# Patient Record
Sex: FEMALE | Race: WHITE | NOT HISPANIC OR LATINO | URBAN - METROPOLITAN AREA
[De-identification: names, ages, dates, MRNs, and addresses within clinical notes are randomized per-mention and may not be internally consistent; named-entity substitution may affect disease eponyms.]

---
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
## 2019-01-08 ENCOUNTER — AMBULATORY - RIVER FALLS (OUTPATIENT)
Dept: FAMILY MEDICINE | Facility: CLINIC | Age: 19
End: 2019-01-08

## 2019-01-08 ENCOUNTER — OFFICE VISIT - RIVER FALLS (OUTPATIENT)
Dept: FAMILY MEDICINE | Facility: CLINIC | Age: 19
End: 2019-01-08

## 2019-01-08 ASSESSMENT — MIFFLIN-ST. JEOR: SCORE: 1505.67

## 2020-08-29 ENCOUNTER — LAB REQUISITION (OUTPATIENT)
Age: 20
End: 2020-08-29
Payer: COMMERCIAL

## 2020-08-29 DIAGNOSIS — Z20.822 ENCOUNTER FOR SCREENING LABORATORY TESTING FOR COVID-19 VIRUS: ICD-10-CM

## 2020-08-31 LAB — SARS-COV-2 BY PCR: NOT DETECTED

## 2020-10-03 ENCOUNTER — LAB REQUISITION (OUTPATIENT)
Age: 20
End: 2020-10-03
Payer: COMMERCIAL

## 2020-10-03 DIAGNOSIS — Z20.828 CONTACT WITH OR EXPOSURE TO VIRAL DISEASE: ICD-10-CM

## 2020-10-24 ENCOUNTER — LAB REQUISITION (OUTPATIENT)
Age: 20
End: 2020-10-24
Payer: COMMERCIAL

## 2020-10-24 DIAGNOSIS — Z20.828 CONTACT WITH OR EXPOSURE TO VIRAL DISEASE: ICD-10-CM

## 2020-11-07 ENCOUNTER — LAB REQUISITION (OUTPATIENT)
Age: 20
End: 2020-11-07
Payer: COMMERCIAL

## 2020-11-07 DIAGNOSIS — Z20.828 CONTACT WITH OR EXPOSURE TO VIRAL DISEASE: ICD-10-CM

## 2021-08-24 ENCOUNTER — HOSPITAL ENCOUNTER (EMERGENCY)
Facility: HOSPITAL | Age: 21
Discharge: LEFT BEFORE DISCHARGE | End: 2021-08-24
Payer: COMMERCIAL

## 2021-08-25 ENCOUNTER — APPOINTMENT (OUTPATIENT)
Dept: CT IMAGING | Facility: HOSPITAL | Age: 21
DRG: 392 | End: 2021-08-25
Attending: STUDENT IN AN ORGANIZED HEALTH CARE EDUCATION/TRAINING PROGRAM
Payer: COMMERCIAL

## 2021-08-25 ENCOUNTER — HOSPITAL ENCOUNTER (INPATIENT)
Facility: HOSPITAL | Age: 21
LOS: 1 days | Discharge: HOME | DRG: 392 | End: 2021-08-26
Attending: STUDENT IN AN ORGANIZED HEALTH CARE EDUCATION/TRAINING PROGRAM | Admitting: HOSPITALIST
Payer: COMMERCIAL

## 2021-08-25 DIAGNOSIS — R11.0 NAUSEA: ICD-10-CM

## 2021-08-25 DIAGNOSIS — R10.9 ABDOMINAL PAIN, ACUTE: Primary | ICD-10-CM

## 2021-08-25 DIAGNOSIS — K52.9 COLITIS: ICD-10-CM

## 2021-08-25 DIAGNOSIS — R19.7 DIARRHEA, UNSPECIFIED TYPE: ICD-10-CM

## 2021-08-25 DIAGNOSIS — N39.0 URINARY TRACT INFECTION WITHOUT HEMATURIA, SITE UNSPECIFIED: ICD-10-CM

## 2021-08-25 LAB
ALBUMIN SERPL-MCNC: 3.5 G/DL (ref 3.4–5)
ALBUMIN/GLOB SERPL: 0.8 {RATIO} (ref 1–2)
ALP LIVER SERPL-CCNC: 57 U/L
ALT SERPL-CCNC: 18 U/L
ANION GAP SERPL CALC-SCNC: 4 MMOL/L (ref 0–18)
AST SERPL-CCNC: 16 U/L (ref 15–37)
B-HCG UR QL: NEGATIVE
BASOPHILS # BLD: 0 X10(3) UL (ref 0–0.2)
BASOPHILS NFR BLD: 0 %
BILIRUB SERPL-MCNC: 0.4 MG/DL (ref 0.1–2)
BILIRUB UR QL STRIP.AUTO: NEGATIVE
BUN BLD-MCNC: 8 MG/DL (ref 7–18)
CALCIUM BLD-MCNC: 8.7 MG/DL (ref 8.5–10.1)
CHLORIDE SERPL-SCNC: 106 MMOL/L (ref 98–112)
CO2 SERPL-SCNC: 25 MMOL/L (ref 21–32)
CREAT BLD-MCNC: 0.9 MG/DL
EOSINOPHIL # BLD: 0 X10(3) UL (ref 0–0.7)
EOSINOPHIL NFR BLD: 0 %
ERYTHROCYTE [DISTWIDTH] IN BLOOD BY AUTOMATED COUNT: 11.9 %
GLOBULIN PLAS-MCNC: 4.2 G/DL (ref 2.8–4.4)
GLUCOSE BLD-MCNC: 96 MG/DL (ref 70–99)
GLUCOSE UR STRIP.AUTO-MCNC: NEGATIVE MG/DL
HCT VFR BLD AUTO: 42.9 %
HGB BLD-MCNC: 14.2 G/DL
LIPASE SERPL-CCNC: 39 U/L (ref 73–393)
LYMPHOCYTES NFR BLD: 1.62 X10(3) UL (ref 1–4)
LYMPHOCYTES NFR BLD: 20 %
M PROTEIN MFR SERPL ELPH: 7.7 G/DL (ref 6.4–8.2)
MCH RBC QN AUTO: 30.5 PG (ref 26–34)
MCHC RBC AUTO-ENTMCNC: 33.1 G/DL (ref 31–37)
MCV RBC AUTO: 92.3 FL
MONOCYTES # BLD: 1.05 X10(3) UL (ref 0.1–1)
MONOCYTES NFR BLD: 13 %
MORPHOLOGY: NORMAL
NEUTROPHILS # BLD AUTO: 5.47 X10 (3) UL (ref 1.5–7.7)
NEUTROPHILS NFR BLD: 42 %
NEUTS BAND NFR BLD: 25 %
NEUTS HYPERSEG # BLD: 5.43 X10(3) UL (ref 1.5–7.7)
NITRITE UR QL STRIP.AUTO: NEGATIVE
OSMOLALITY SERPL CALC.SUM OF ELEC: 278 MOSM/KG (ref 275–295)
PH UR STRIP.AUTO: 5 [PH] (ref 5–8)
PLATELET # BLD AUTO: 207 10(3)UL (ref 150–450)
PLATELET MORPHOLOGY: NORMAL
POTASSIUM SERPL-SCNC: 3.4 MMOL/L (ref 3.5–5.1)
PROT UR STRIP.AUTO-MCNC: 30 MG/DL
RBC # BLD AUTO: 4.65 X10(6)UL
RBC UR QL AUTO: NEGATIVE
SARS-COV-2 RNA RESP QL NAA+PROBE: NOT DETECTED
SODIUM SERPL-SCNC: 135 MMOL/L (ref 136–145)
SP GR UR STRIP.AUTO: 1.02 (ref 1–1.03)
TOTAL CELLS COUNTED: 100
UROBILINOGEN UR STRIP.AUTO-MCNC: <2 MG/DL
WBC # BLD AUTO: 8.1 X10(3) UL (ref 4–11)
WBC #/AREA URNS AUTO: >50 /HPF

## 2021-08-25 PROCEDURE — 74177 CT ABD & PELVIS W/CONTRAST: CPT | Performed by: STUDENT IN AN ORGANIZED HEALTH CARE EDUCATION/TRAINING PROGRAM

## 2021-08-25 PROCEDURE — 99222 1ST HOSP IP/OBS MODERATE 55: CPT | Performed by: HOSPITALIST

## 2021-08-25 RX ORDER — ONDANSETRON 2 MG/ML
4 INJECTION INTRAMUSCULAR; INTRAVENOUS EVERY 6 HOURS PRN
Status: DISCONTINUED | OUTPATIENT
Start: 2021-08-25 | End: 2021-08-26

## 2021-08-25 RX ORDER — CIPROFLOXACIN 2 MG/ML
400 INJECTION, SOLUTION INTRAVENOUS EVERY 12 HOURS
Status: DISCONTINUED | OUTPATIENT
Start: 2021-08-25 | End: 2021-08-26

## 2021-08-25 RX ORDER — METRONIDAZOLE 500 MG/100ML
500 INJECTION, SOLUTION INTRAVENOUS ONCE
Status: COMPLETED | OUTPATIENT
Start: 2021-08-25 | End: 2021-08-25

## 2021-08-25 RX ORDER — KETOROLAC TROMETHAMINE 30 MG/ML
15 INJECTION, SOLUTION INTRAMUSCULAR; INTRAVENOUS ONCE
Status: COMPLETED | OUTPATIENT
Start: 2021-08-25 | End: 2021-08-25

## 2021-08-25 RX ORDER — METRONIDAZOLE 500 MG/100ML
500 INJECTION, SOLUTION INTRAVENOUS EVERY 8 HOURS
Status: DISCONTINUED | OUTPATIENT
Start: 2021-08-25 | End: 2021-08-26

## 2021-08-25 RX ORDER — ONDANSETRON 2 MG/ML
4 INJECTION INTRAMUSCULAR; INTRAVENOUS EVERY 4 HOURS PRN
Status: DISCONTINUED | OUTPATIENT
Start: 2021-08-25 | End: 2021-08-25 | Stop reason: ALTCHOICE

## 2021-08-25 RX ORDER — SODIUM CHLORIDE 9 MG/ML
INJECTION, SOLUTION INTRAVENOUS CONTINUOUS
Status: DISCONTINUED | OUTPATIENT
Start: 2021-08-25 | End: 2021-08-26

## 2021-08-25 RX ORDER — CIPROFLOXACIN 2 MG/ML
400 INJECTION, SOLUTION INTRAVENOUS ONCE
Status: COMPLETED | OUTPATIENT
Start: 2021-08-25 | End: 2021-08-25

## 2021-08-25 RX ORDER — MORPHINE SULFATE 4 MG/ML
4 INJECTION, SOLUTION INTRAMUSCULAR; INTRAVENOUS EVERY 30 MIN PRN
Status: ACTIVE | OUTPATIENT
Start: 2021-08-25 | End: 2021-08-25

## 2021-08-25 RX ORDER — ONDANSETRON 2 MG/ML
4 INJECTION INTRAMUSCULAR; INTRAVENOUS ONCE
Status: COMPLETED | OUTPATIENT
Start: 2021-08-25 | End: 2021-08-25

## 2021-08-25 RX ORDER — ACETAMINOPHEN 500 MG
1000 TABLET ORAL EVERY 6 HOURS PRN
COMMUNITY

## 2021-08-25 RX ORDER — MORPHINE SULFATE 2 MG/ML
2 INJECTION, SOLUTION INTRAMUSCULAR; INTRAVENOUS ONCE
Status: COMPLETED | OUTPATIENT
Start: 2021-08-25 | End: 2021-08-25

## 2021-08-25 RX ORDER — SODIUM CHLORIDE 9 MG/ML
INJECTION, SOLUTION INTRAVENOUS CONTINUOUS
Status: ACTIVE | OUTPATIENT
Start: 2021-08-25 | End: 2021-08-25

## 2021-08-25 NOTE — ED INITIAL ASSESSMENT (HPI)
Patient here with c/o fever and N/V/D since Sunday. Patient also reports abdominal pain. Patient had recent travel to [place] and reports the people she was with got sick too. Patient is vaccinated.   Last tylenol at 1030PM

## 2021-08-25 NOTE — H&P
[facility]PITAL[facility]  History and Physical     Kassidyabraham Sears Patient Status:  Inpatient    2000 MRN [id number]   [facility] 0SW-A Attending Jose G Albright MD   Hosp Day # 0 PCP PHYSICIAN NONSTAFF     Chief Complaint: Nausea vomiting zahida atraumatic. Moist mucous membranes. EOM-I. PERRLA. Anicteric. Neck: No lymphadenopathy. No JVD. No carotid bruits. Respiratory: Clear to auscultation bilaterally. No wheezes. No rhonchi. Cardiovascular: S1, S2. Regular rate and rhythm.  No murmurs, rubs Yes    Plan of care discussed with patient and emergency room physician    Zahida Guevara MD  8/25/2021

## 2021-08-25 NOTE — PROGRESS NOTES
Assumed care at 1100. Patient is A&Ox4. She is on RA. Having some N/V. Zofran given in ED. Having some abdominal pain-morphine given in ED. She is getting IV fluids at 100 ml/h. Flagyl given. KCl being replaced-K=3.4.   She will transfer off the floo

## 2021-08-25 NOTE — PROGRESS NOTES
Gave report to [facility]. Patient will transfer to room 302.

## 2021-08-25 NOTE — CONSULTS
[facility]                       Gastroenterology [address] [facility]    [address] Patient Status:  Emergency    2000 MRN [id number]   Location [address] Attending MD Carmen Arrieta Ciprofloxacin in D5W (CIPRO) IVPB premix SOLN 400 mg, 400 mg, Intravenous, Once  [COMPLETED] metRONIDAZOLE in NaCl (FLAGYL) IVPB premix 500 mg, 500 mg, Intravenous, Once  [COMPLETED] morphINE sulfate (PF) 2 MG/ML injection 2 mg, 2 mg, Intravenous, Once LMP 07/30/2021   SpO2 97%   BMI 21.41 kg/m²   Gen: AAO x 3, able to speak in complete sentences  HENT: EOMI, PERRL, oropharynx is clear with moist mucosal membranes  Eyes: Sclerae are anicteric  Neck:  Supple without nuchal rigidity  CV: Regular rate and r transmitted to the A[employer] ([employer]) Debbie Lambert 35 ([address]) which includes the Dose Index Registry.       PATIENT STATED HISTORY:(As transcribed by Technologist) Rudean Amos and left lower quadrant pain with nausea and vomit inflammatory brown disease, Crohn's is   favored, also in the discs differential is ulcerative colitis.  No obstruction.  The appendix is fluid-filled but not enlarged by criteria.  Small crenated 1.8 cm right ovarian cyst.       Dictated by (CST): Phyllis ileocolitis, sick contacts post travel, abdomen soft, tender, BS present, normal heart rate and resp effort, r/o infectious, inflammatrory process, plan for stool studies, empiric antibiotics, gut resp    Han Kevin MD MD  [facility]

## 2021-08-25 NOTE — ED PROVIDER NOTES
Patient Seen in: [facility] Emergency Department      History   Patient presents with:  Nausea/Vomiting/Diarrhea  Fever    Stated Complaint: fever, n/v/d    HPI/Subjective:   HPI    Patient is a 68-year-old female who presents emergency department f No scleral icterus. Neck: Normal range of motion. Neck supple. Cardiovascular: Normal rate, regular rhythm, normal heart sounds and intact distal pulses. Exam reveals no gallop and no friction rub. No murmur heard.   Pulmonary/Chest: Effort normal. N -----------         ------                     CBC W/ DIFFERENTIAL[[id number]]                              Final result                 Please view results for these tests on the individual orders.    SCAN SLIDE   URINE CULTURE, ROUTINE   GI STOOL PANEL BY unspecified  Nausea  Diarrhea, unspecified type     Disposition:  Admit  8/25/2021  8:10 am    Follow-up:  No follow-up provider specified. Medications Prescribed:  There are no discharge medications for this patient.                         Hospital

## 2021-08-26 VITALS
BODY MASS INDEX: 21.37 KG/M2 | TEMPERATURE: 98 F | WEIGHT: 144.31 LBS | OXYGEN SATURATION: 98 % | RESPIRATION RATE: 18 BRPM | DIASTOLIC BLOOD PRESSURE: 67 MMHG | SYSTOLIC BLOOD PRESSURE: 106 MMHG | HEART RATE: 58 BPM | HEIGHT: 69 IN

## 2021-08-26 LAB
ANION GAP SERPL CALC-SCNC: 6 MMOL/L (ref 0–18)
BASOPHILS # BLD AUTO: 0.03 X10(3) UL (ref 0–0.2)
BASOPHILS NFR BLD AUTO: 0.5 %
BUN BLD-MCNC: 5 MG/DL (ref 7–18)
C DIFF TOX B STL QL: NEGATIVE
CALCIUM BLD-MCNC: 8.1 MG/DL (ref 8.5–10.1)
CHLORIDE SERPL-SCNC: 111 MMOL/L (ref 98–112)
CO2 SERPL-SCNC: 23 MMOL/L (ref 21–32)
CREAT BLD-MCNC: 0.71 MG/DL
EOSINOPHIL # BLD AUTO: 0.05 X10(3) UL (ref 0–0.7)
EOSINOPHIL NFR BLD AUTO: 0.8 %
ERYTHROCYTE [DISTWIDTH] IN BLOOD BY AUTOMATED COUNT: 11.9 %
GLUCOSE BLD-MCNC: 80 MG/DL (ref 70–99)
HAV IGM SER QL: 1.9 MG/DL (ref 1.6–2.6)
HCT VFR BLD AUTO: 33 %
HGB BLD-MCNC: 11.3 G/DL
IMM GRANULOCYTES # BLD AUTO: 0.03 X10(3) UL (ref 0–1)
IMM GRANULOCYTES NFR BLD: 0.5 %
LYMPHOCYTES # BLD AUTO: 1.34 X10(3) UL (ref 1–4)
LYMPHOCYTES NFR BLD AUTO: 21.3 %
MCH RBC QN AUTO: 31.6 PG (ref 26–34)
MCHC RBC AUTO-ENTMCNC: 34.2 G/DL (ref 31–37)
MCV RBC AUTO: 92.2 FL
MONOCYTES # BLD AUTO: 0.87 X10(3) UL (ref 0.1–1)
MONOCYTES NFR BLD AUTO: 13.8 %
NEUTROPHILS # BLD AUTO: 3.97 X10 (3) UL (ref 1.5–7.7)
NEUTROPHILS # BLD AUTO: 3.97 X10(3) UL (ref 1.5–7.7)
NEUTROPHILS NFR BLD AUTO: 63.1 %
OSMOLALITY SERPL CALC.SUM OF ELEC: 286 MOSM/KG (ref 275–295)
PLATELET # BLD AUTO: 162 10(3)UL (ref 150–450)
POTASSIUM SERPL-SCNC: 3.6 MMOL/L (ref 3.5–5.1)
RBC # BLD AUTO: 3.58 X10(6)UL
SODIUM SERPL-SCNC: 140 MMOL/L (ref 136–145)
WBC # BLD AUTO: 6.3 X10(3) UL (ref 4–11)

## 2021-08-26 PROCEDURE — 99239 HOSP IP/OBS DSCHRG MGMT >30: CPT | Performed by: HOSPITALIST

## 2021-08-26 RX ORDER — METRONIDAZOLE 500 MG/1
500 TABLET ORAL 3 TIMES DAILY
Qty: 21 TABLET | Refills: 0 | Status: SHIPPED | OUTPATIENT
Start: 2021-08-26 | End: 2021-09-02

## 2021-08-26 RX ORDER — CIPROFLOXACIN 500 MG/1
500 TABLET, FILM COATED ORAL 2 TIMES DAILY
Qty: 14 TABLET | Refills: 0 | Status: SHIPPED | OUTPATIENT
Start: 2021-08-26 | End: 2021-09-02

## 2021-08-26 NOTE — PAYOR COMM NOTE
--------------  ADMISSION REVIEW     Payor: [employer] MEJIA PPO  Subscriber #:  [id number]  Authorization Number: [id number]    Admit date: 8/25/21  Admit time: 11:05 AM     Patient Seen in: [facility] Emergency Department    History   Stated Complaint: (*)     Squamous Epi.  Cells Many (*)     All other components within normal limits   COMP METABOLIC PANEL (14) - Abnormal; Notable for the following components:    Sodium 135 (*)     Potassium 3.4 (*)     A/G Ratio 0.8 (*)     All other components within n traveled to [place] and was around sick contacts. Patient reports fever chills nausea vomiting diarrhea abdominal pain for 4 days and her last bowel movement was at 2 AM earlier today.   She also noticed that her urine darker than usual.  He denies back pain NSAID use, known dietary indiscretions, or fresh water swimming. CT A/P IV suggests moderate wall thickening involving the distal ileum and right colon with associated mesenteric lymphadenopathy; labs with normal kidney function, albumin, and CBC.  Thus far unremarkable and symptoms fail to improve consider colonoscopy to r/o IBD     8/26:    NURSING:  Pt had small amount of stool appeared dark tarry and bloody sent for stool cultures pending, IV  NS at 100ml/hr, previous pt had a K rider, and IV antibiotics. metRONIDAZOLE in NaCl (FLAGYL) IVPB premix 500 mg     Date Action Dose Route User    8/26/2021 0758 New Bag 500 mg Intravenous Stevie Velásquez RN    8/26/2021 0026 New Bag 500 mg Intravenous Saige Cotter RN    8/25/2021 1643 New Bag 500 mg Javier Weinstein

## 2021-08-26 NOTE — PROGRESS NOTES
Vss. Pt a&ox3. Pt on ra with 02 sats wnl. Lungs cta. Pt denies n/v. Tolerating low residue diet well today. Denies pain. Voiding with good output. Iv removed, site intact. Ok to discharge from GI perspective. Dr. Rylee Hamilton paged regarding discharge.

## 2021-08-26 NOTE — DISCHARGE SUMMARY
[facility] [place] HOSPITALIST  DISCHARGE SUMMARY     Eri Sow Patient Status:  Inpatient    2000 MRN [id number]   [facility] 3NW-A Attending Anitra Quiroz MD   Hosp Day # 1 PCP PHYSICIAN NONSTAFF     Date of Admission: 2021  Date of Dis details   acetaminophen 500 MG Tabs  Commonly known as: TYLENOL EXTRA STRENGTH      Take 1,000 mg by mouth every 6 (six) hours as needed for Pain. Refills: 0     MELATONIN OR      Take 2 tablets by mouth nightly as needed.    Refills: 0           Where to

## 2021-08-26 NOTE — PROGRESS NOTES
NURSING DISCHARGE NOTE    Discharged Home via Ambulatory. Accompanied by RN  Belongings Taken by patient/family. Vss. Pt a&ox3. Pt on ra with 02 sats wnl. Lungs cta. Pt denies difficulty breathing or sob. Pt denies chest pain. Denies n/v.  Toleratin

## 2021-08-26 NOTE — PROGRESS NOTES
[facility]  Progress Note     Davi Freire Patient Status:  Inpatient    2000 MRN [id number]   [facility] 3NW-A Attending Sadi Guerrero MD   Hosp Day # 1 PCP PHYSICIAN NONSTAFF     Chief Complaint: abd pain diarrhea    S: Liseth last 168 hours. Inflammatory Markers  No results for input(s): CRP, OMER, LDH, DDIMER in the last 168 hours. Imaging: Imaging data reviewed in Epic.     Medications:   • metRONIDAZOLE  500 mg Intravenous Q8H   • ciprofloxacin  400 mg Intravenous Q12H

## 2021-08-26 NOTE — PROGRESS NOTES
[facility]  Progress Note    Ailyn Brooks Patient Status:  Inpatient    2000 MRN [id number]   [facility] 3NW-A Attending Mohamud Rowland MD   Date 2021 PCP PHYSICIAN NONSTAFF     Subjective:  Ailyn Brooks is a(n) 21 year o

## 2021-08-26 NOTE — PLAN OF CARE
Pt axox4, resting in bed, pt denies any pain, pt walking to toilet, voiding clear yellow urine, pt had small amount of stool appeared dark tarry and bloody sent for stool cultures pending,  Pt PIV infusing NS at 100ml/hr, previous pt had a K rider, and IV Smoking Cessation handout, if applicable  - Encourage broncho-pulmonary hygiene including cough, deep breathe, Incentive Spirometry  - Assess the need for suctioning and perform as needed  - Assess and instruct to report SOB or any respiratory difficulty imbalances  - Administer electrolyte replacement as ordered  - Monitor response to electrolyte replacements, including rhythm and repeat lab results as appropriate  - Fluid restriction as ordered  - Instruct patient on fluid and nutrition restrictions as a

## 2021-09-08 LAB
ADENOVIRUS F 40/41 PCR: NEGATIVE
ASTROVIRUS PCR: NEGATIVE
C CAYETANENSIS DNA SPEC QL NAA+PROBE: NEGATIVE
CAMPY SP DNA.DIARRHEA STL QL NAA+PROBE: NEGATIVE
CRYPTOSP DNA SPEC QL NAA+PROBE: NEGATIVE
EAEC PAA PLAS AGGR+AATA ST NAA+NON-PRB: NEGATIVE
EC STX1+STX2 + H7 FLIC SPEC NAA+PROBE: NEGATIVE
ENTAMOEBA HISTOLYTICA PCR: NEGATIVE
EPEC EAE GENE STL QL NAA+NON-PROBE: POSITIVE
ETEC LTA+ST1A+ST1B TOX ST NAA+NON-PROBE: NEGATIVE
GIARDIA LAMBLIA PCR: NEGATIVE
NOROVIRUS GI/GII PCR: NEGATIVE
P SHIGELLOIDES DNA STL QL NAA+PROBE: NEGATIVE
ROTAVIRUS A PCR: NEGATIVE
SALMONELLA DNA SPEC QL NAA+PROBE: POSITIVE
SAPOVIRUS PCR: NEGATIVE
SHIGELLA SP+EIEC IPAH ST NAA+NON-PROBE: NEGATIVE
V CHOLERAE DNA SPEC QL NAA+PROBE: NEGATIVE
VIBRIO DNA SPEC NAA+PROBE: NEGATIVE
YERSINIA DNA SPEC NAA+PROBE: NEGATIVE

## 2022-02-11 VITALS
BODY MASS INDEX: 21.98 KG/M2 | HEART RATE: 85 BPM | SYSTOLIC BLOOD PRESSURE: 115 MMHG | HEIGHT: 68 IN | TEMPERATURE: 98.6 F | DIASTOLIC BLOOD PRESSURE: 77 MMHG | WEIGHT: 145 LBS

## 2022-02-12 VITALS
SYSTOLIC BLOOD PRESSURE: 120 MMHG | HEIGHT: 68 IN | BODY MASS INDEX: 23.46 KG/M2 | TEMPERATURE: 97.4 F | HEART RATE: 68 BPM | DIASTOLIC BLOOD PRESSURE: 68 MMHG | WEIGHT: 154.8 LBS

## 2022-02-15 NOTE — PROGRESS NOTES
Patient:   DESI JENKINS            MRN: [id number]            FIN: [id number]               Age:   16 years     Sex:  Female     :  2000   Associated Diagnoses:   Urinary tract infection, site not specified   Author:   Nadia Martin      Visit Information      Date of Service: 2017 11:23 am  Performing Location: [facility]  Encounter#: [id number]      Primary Care Provider (PCP):  Not recorded.   Visit type:  General concerns.    Accompanied by:  No one.    History limitation:  None.       Chief Complaint   2017 11:39 AM CDT    Patient is here with c/o dysuria, urinary urgency, and some blood for the past 3 days.      History of Present Illness   Pt is here complaining of dysuria, urinary frequency and hematuria x 3 days.  She has not tried anything to relieve her symptoms.  She typically gets 2-3 UTIs per year.  She denies h/o pyelonephritis.  Denies fever, chills or back pain. Denies abnormal vaginal discharge. She is taking oral contraceptiives.      Review of Systems   Constitutional:  No fever, No chills, No sweats.    Gastrointestinal:  No nausea, No vomiting, No abdominal pain.    Genitourinary:  Dysuria, No CVA tenderness, No hematuria, No urethral discharge.    Gynecologic:  No vaginal discharge.    ROS negative except as noted in HPI.      Health Status   Allergies:    Allergic Reactions (Selected)  No Known Medication Allergies   Medications:  (Selected)   Prescriptions  Prescribed  Macrobid 100 mg oral capsule: 1 cap(s) ( 100 mg ), PO, BID, # 14 cap(s), 0 Refill(s), Type: Maintenance, Pharmacy: [id number], 1 cap(s) po bid,x7 day(s)  Pyridium 200 mg oral tablet: 1 tab(s) ( 200 mg ), PO, TID, # 9 tab(s), 0 Refill(s), Type: Maintenance, Pharmacy: [id number], 1 tab(s) po tid,x3 day(s)  Documented Medications  Documented  OCP: OCP, Supply, 0 Refill(s), Type: Maintenance   Problem list:    No problem items selected or recorded.       Histories   Past Medical History:    Resolved  UTI (urinary tract infection) ([id number]):  Resolved.   Family History:       Procedure history:    No active procedure history items have been selected or recorded.   Social History:        Alcohol Assessment: Denies Alcohol Use            Never      Tobacco Assessment: Denies Tobacco Use            Never smoker      Substance Abuse Assessment: Denies Substance Abuse            Never      Exercise and Physical Activity Assessment: Regular exercise            Exercise frequency: 5-6 times/week.  Exercise type: Weight lifting.        Physical Examination   Last Menstrual Period: 3/27/2017   Vital Signs   4/4/2017 11:39 AM CDT Temperature Temporal 98.6 DegF    Peripheral Pulse Rate 85 bpm    HR Method Electronic    Systolic Blood Pressure 115 mmHg    Diastolic Blood Pressure 77 mmHg    Mean Arterial Pressure 90 mmHg    BP Site Right arm    BP Method Electronic      Measurements from flowsheet : Measurements   4/4/2017 11:39 AM CDT Height Measured - Standard 68 in    Weight Measured - Standard 145 lb    BSA 1.77 m2    Body Mass Index 22.04 kg/m2    Body Mass Index Percentile 56.32    Ht/Wt Measurement Refused by Patient? No      General:  Alert and oriented, No acute distress.    Eye:  Normal conjunctiva, Vision unchanged.    HENT:  Normocephalic, Normal hearing.    Respiratory:  Lungs are clear to auscultation.    Cardiovascular:  Normal rate, Regular rhythm, No edema.    Genitourinary:  No costovertebral angle tenderness, No flank pain.    Musculoskeletal:  Normal range of motion.    Integumentary:  Warm, Dry.    Neurologic:  Alert, Oriented.    Psychiatric:  Cooperative, Appropriate mood & affect.       Review / Management   Results review:  Lab results   4/4/2017 11:22 AM CDT UA Color Yellow    UA Clarity Cloudy    UA pH 6.0    UA Specific Gravity 1.025    UA Glucose Negative mg/dL    UA Bilirubin Negative    UA Ketones Negative mg/dL    U  Occult Blood Large    UA Protein 30 mg/dL    UA Nitrite Positive    UA Leuk Est Large    UA Urobilinogen Normal    UA Epithelial Cells Moderate    UA WBC >100    UA RBC 26-50    UA Bacteria Many   .         Interpretation: Abnormal results  Urinalysis consistent with Urinary Tract Infection.       Impression and Plan   Diagnosis     Urinary tract infection, site not specified ([id number] N39.0).     Orders     Return to clinic or ER if no improvements or worsening signs symptoms.     Symptomatic care guidelines reviewed.     Urine will be cultured to test growth and sensitivity. We will call patient if bacterium cultured is not sensitive to antibiotic prescribed, or if culture does not return positive.     Rx for macrobid and pyridium.   Counseled:  Reviewed the importance of adequate PO intake, if unable to take medication by mouth return to clinic or ER for evaluation.    Advised to void after sexual intercourse and wipe front to back after using toilet.

## 2022-02-15 NOTE — NURSING NOTE
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   Menstrual Status :   Menarcheal   Weight Measured :   154.8 lb(Converted to: 154 lb 13 oz, 70.22 kg)    Height Measured :   68 in(Converted to: 5 ft 8 in, 172.72 cm)    Body Mass Index :   23.53 kg/m2   Body Surface Area :   1.83 m2   Systolic Blood Pressure :   120 mmHg   Diastolic Blood Pressure :   68 mmHg   Mean Arterial Pressure :   85 mmHg   Peripheral Pulse Rate :   68 bpm   BP Site :   Right arm   Pulse Site :   Radial artery   BP Method :   Manual   HR Method :   Manual   Temperature Tympanic :   97.4 DegF(Converted to: 36.3 DegC)  (LOW)    Swati Gaytan - 1/8/2019 8:40 AM CST   Health Status   Allergies Verified? :   Yes   Medication History Verified? :   Yes   Medical History Verified? :   Yes   Pre-Visit Planning Status :   Completed   Tobacco Use? :   Never smoker   Swati Gaytan - 1/8/2019 8:40 AM CST   Consents   Consent for Immunization Exchange :   Consent Granted   Consent for Immunizations to Providers :   Consent Granted   Swati Gaytan - 1/8/2019 8:40 AM CST   Meds / Allergies   (As Of: 1/8/2019 8:48:22 AM CST)   Allergies (Active)   No Known Medication Allergies  Estimated Onset Date:   Unspecified ; Created By:   Ang Poole MD; Reaction Status:   Active ; Category:   Drug ; Substance:   No Known Medication Allergies ; Type:   Allergy ; Updated By:   Ang oPole MD; Reviewed Date:   1/8/2019 8:47 AM CST        Medication List   (As Of: 1/8/2019 8:48:22 AM CST)   Prescription/Discharge Order    nitrofurantoin  :   nitrofurantoin ; Status:   Processing ; Ordered As Mnemonic:   Macrobid 100 mg oral capsule ; Ordering Provider:   Nadia Martin; Action Display:   Complete ; Catalog Code:   nitrofurantoin ; Order Dt/Tm:   1/8/2019 8:48:09 AM           phenazopyridine  :   phenazopyridine ; Status:   Processing ; Ordered As Mnemonic:   Pyridium 200 mg oral tablet ; Ordering Provider:   Nadia Martin; Action Display:   Complete ; Catalog Code:   phenazopyridine ; Order Dt/Tm:   1/8/2019 8:48:09 AM            Home Meds    Miscellaneous Prescription  :   Miscellaneous Prescription ; Status:   Documented ; Ordered As Mnemonic:   OCP ; Simple Display Line:   0 Refill(s) ; Catalog Code:   Miscellaneous Prescription ; Order Dt/Tm:   4/4/2017 11:44:51 AM

## 2022-02-15 NOTE — PROGRESS NOTES
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  .     see copy of H and P  she is [employer] student, would like booster tdap and flu      Health Status   Allergies:    Allergic Reactions (Selected)  No Known Medication Allergies   Medications:  (Selected)   Documented Medications  Documented  OCP: OCP, Supply, 0 Refill(s), Type: Maintenance   Problem list:    All Problems  Resolved: UTI (urinary tract infection) / SNOMED CT [id number]      Objective   Vital Signs   2019 8:40 AM CST Temperature Tympanic 97.4 DegF  LOW    Peripheral Pulse Rate 68 bpm    Pulse Site Radial artery    HR Method Manual    Systolic Blood Pressure 120 mmHg    Diastolic Blood Pressure 68 mmHg    Mean Arterial Pressure 85 mmHg    BP Site Right arm    BP Method Manual      Measurements from flowsheet : Measurements   2019 8:40 AM CST Height Measured - Standard 68 in    Weight Measured - Standard 154.8 lb    BSA 1.83 m2    Body Mass Index 23.53 kg/m2         Impression and Plan   Assessment and Plan:       Diagnosis: Pre-employment examination ([id number] Z02.1).